# Patient Record
Sex: FEMALE | Race: OTHER | NOT HISPANIC OR LATINO | URBAN - METROPOLITAN AREA
[De-identification: names, ages, dates, MRNs, and addresses within clinical notes are randomized per-mention and may not be internally consistent; named-entity substitution may affect disease eponyms.]

---

## 2017-10-13 ENCOUNTER — EMERGENCY (EMERGENCY)
Facility: HOSPITAL | Age: 47
LOS: 1 days | Discharge: HOME | End: 2017-10-13
Attending: EMERGENCY MEDICINE | Admitting: EMERGENCY MEDICINE
Payer: COMMERCIAL

## 2017-10-13 VITALS
DIASTOLIC BLOOD PRESSURE: 92 MMHG | SYSTOLIC BLOOD PRESSURE: 152 MMHG | OXYGEN SATURATION: 99 % | TEMPERATURE: 98 F | HEART RATE: 76 BPM | RESPIRATION RATE: 16 BRPM

## 2017-10-13 PROCEDURE — 99283 EMERGENCY DEPT VISIT LOW MDM: CPT

## 2017-10-13 RX ORDER — IBUPROFEN 200 MG
600 TABLET ORAL ONCE
Qty: 0 | Refills: 0 | Status: COMPLETED | OUTPATIENT
Start: 2017-10-13 | End: 2017-10-13

## 2017-10-13 NOTE — ED PROVIDER NOTE - ATTENDING CONTRIBUTION TO CARE
46F with no PMHx p/w HA x4 days. patient states HA is throbbing and feels tight across her head. denies any nausea, vomiting, visual changes, weakness. c/o mild neck pain, aching. hasn't tried any medications to resolve HA. pain has been pretty constant. HA not worse with light/noise. no associated eye tearing. On exam: afebrile, no neck stiffness, HEENT neg, no papilledema, non focal neuro exam. Likely tension HA, no indication for emergency imaging. Pain improved spontaneously and pt declined analgesia. DC home. Warned of danger signs and need for follow up.

## 2017-10-13 NOTE — ED ADULT TRIAGE NOTE - CHIEF COMPLAINT QUOTE
pt amb to triage c/o head and neck pain x 4 days, no OTC pain meds, denies CP, SOB, vision changes, lightheadedness or dizziness

## 2017-10-13 NOTE — ED PROVIDER NOTE - OBJECTIVE STATEMENT
46F with no PMHx p/w HA x4 days. patient states HA is throbbing and feels tight across her head. denies any nausea, vomiting, visual changes, weakness. c/o mild neck pain, aching. hasn't tried any medications to resolve HA. pain has been pretty constant. HA not worse with light/noise. no associated eye tearing.

## 2017-10-13 NOTE — ED ADULT NURSE NOTE - OBJECTIVE STATEMENT
Pt 46y female, aaox4 and amb, presents to ED c/o HA and neck pain. Pt states pain started monday, pain 2/10, denies taking any medication for pain at home. Pt denies blurry vision, cp, sob, fever, chills, abd pain, n/v/d. States she believe ha might be related to HTN, pt not on meds for HTN, will continue to monitor.

## 2021-09-13 NOTE — ED PROVIDER NOTE - NEUROLOGICAL, MLM
Alert and oriented, no focal deficits, no motor or sensory deficits. Plan: Soften and remove scales from your face, Wash your\\nAvoid skin and hair products that contain alcohol.\\nIf you have a beard or mustache, shampoo facial hair regularly. ...\\nGently clean your eyelids. Detail Level: Zone Initiate Treatment: CeraVe hydrating liquid cleanser

## 2024-07-30 NOTE — ED PROVIDER NOTE - CPE EDP SKIN NORM
Bilateral pleural effusion as evidenced on CT chest.  Most likely cardiogenic in origin given her past medical history of diastolic heart failure.  Not currently on any GDMT at home.  IV Lasix as mentioned in heart failure.  Pulmonology consulted for possibility of therapeutic thoracentesis.   normal...